# Patient Record
Sex: MALE | URBAN - METROPOLITAN AREA
[De-identification: names, ages, dates, MRNs, and addresses within clinical notes are randomized per-mention and may not be internally consistent; named-entity substitution may affect disease eponyms.]

---

## 2018-02-12 ENCOUNTER — OFFICE VISIT (OUTPATIENT)
Dept: OPHTHALMOLOGY | Facility: CLINIC | Age: 63
End: 2018-02-12
Payer: COMMERCIAL

## 2018-02-12 DIAGNOSIS — H52.4 PRESBYOPIA: ICD-10-CM

## 2018-02-12 DIAGNOSIS — H52.13 MYOPIA OF BOTH EYES: ICD-10-CM

## 2018-02-12 DIAGNOSIS — H43.813 POSTERIOR VITREOUS DETACHMENT, BILATERAL: ICD-10-CM

## 2018-02-12 DIAGNOSIS — H25.13 NUCLEAR SCLEROSIS OF BOTH EYES: Primary | ICD-10-CM

## 2018-02-12 PROCEDURE — 92004 COMPRE OPH EXAM NEW PT 1/>: CPT | Performed by: STUDENT IN AN ORGANIZED HEALTH CARE EDUCATION/TRAINING PROGRAM

## 2018-02-12 PROCEDURE — 92015 DETERMINE REFRACTIVE STATE: CPT | Performed by: STUDENT IN AN ORGANIZED HEALTH CARE EDUCATION/TRAINING PROGRAM

## 2018-02-12 ASSESSMENT — REFRACTION_WEARINGRX
OS_AXIS: 016
OS_ADD: +2.50
OD_ADD: +2.50
OD_CYLINDER: SPHERE
OS_SPHERE: -10.50
OS_CYLINDER: +0.75
SPECS_TYPE: PAL
OD_SPHERE: -9.00

## 2018-02-12 ASSESSMENT — VISUAL ACUITY
CORRECTION_TYPE: GLASSES
METHOD: SNELLEN - LINEAR
OD_BAT_HIGH: 20/50-1
OD_CC: 20/40
OS_BAT_LOW: 20/200
OS_CC+: -1
OD_BAT_MED: 20/30
OS_PH_CC: 20/40-2
OS_CC: 20/80

## 2018-02-12 ASSESSMENT — TONOMETRY
OD_IOP_MMHG: 14
IOP_METHOD: APPLANATION
OS_IOP_MMHG: 17

## 2018-02-12 ASSESSMENT — REFRACTION_MANIFEST
OD_ADD: +2.50
OS_AXIS: 135
OS_SPHERE: -12.00
OD_CYLINDER: +0.25
OS_ADD: +2.50
OD_AXIS: 180
OS_CYLINDER: +0.25
OD_SPHERE: -9.00

## 2018-02-12 ASSESSMENT — EXTERNAL EXAM - RIGHT EYE: OD_EXAM: NORMAL

## 2018-02-12 ASSESSMENT — CONF VISUAL FIELD
OD_NORMAL: 1
METHOD: COUNTING FINGERS
OS_NORMAL: 1

## 2018-02-12 ASSESSMENT — SLIT LAMP EXAM - LIDS
COMMENTS: NORMAL
COMMENTS: NORMAL

## 2018-02-12 ASSESSMENT — EXTERNAL EXAM - LEFT EYE: OS_EXAM: NORMAL

## 2018-02-12 ASSESSMENT — CUP TO DISC RATIO
OS_RATIO: 0.15
OD_RATIO: 0.15

## 2018-02-12 NOTE — PROGRESS NOTES
Current Eye Medications:  no     Subjective:  Cataract eval, also was told retina was stretched. Vision is blurred distance with glasses for last year. Glasses are 6 years old. Having some watering right eye for last year. Zero pain both eyes.. Floaters forever both eyes, have not increased in size or numbers. Zero flashes, or curtain effect.   Last eye exam was 1 year ago. No previous eye injuries or surgeries.      Objective:  See Ophthalmology Exam.    Assessment:  Juni Ballesteros is a 62 year old male who presents with:     Nuclear sclerosis of both eyes Approaching visually significant.   He would like to try glasses first for the left eye and defer surgery for now. Discussed probably retinal evaluation prior to cataract surgery.      Posterior vitreous detachment, bilateral        Plan:  Glasses prescription given - optional     Ravinder Myers MD  [phone]

## 2018-02-12 NOTE — MR AVS SNAPSHOT
"              After Visit Summary   2/12/2018    Juni Ballesteros    MRN: [id number]           Patient Information     Date Of Birth          1955        Visit Information        Provider Department      2/12/2018 2:15 PM Ravinder Myers MD [facility]        Today's Diagnoses     Nuclear sclerosis of both eyes    -  1    Posterior vitreous detachment, bilateral        Presbyopia        Myopia of both eyes          Care Instructions    Glasses prescription given - optional     Ravinder Myers MD  [phone]                Follow-ups after your visit        Follow-up notes from your care team     Return in about 1 year (around 2/12/2019) for Complete Exam.      Who to contact     If you have questions or need follow up information about today's clinic visit or your schedule please contact [facility] directly at [phone].  Normal or non-critical lab and imaging results will be communicated to you by MyChart, letter or phone within 4 business days after the clinic has received the results. If you do not hear from us within 7 days, please contact the clinic through MyChart or phone. If you have a critical or abnormal lab result, we will notify you by phone as soon as possible.  Submit refill requests through [employer] or call your pharmacy and they will forward the refill request to us. Please allow 3 business days for your refill to be completed.          Additional Information About Your Visit        MyChart Information     [employer] lets you send messages to your doctor, view your test results, renew your prescriptions, schedule appointments and more. To sign up, go to [email or web address] . Click on \"Log in\" on the left side of the screen, which will take you to the Welcome page. Then click on \"Sign up Now\" on the right side of the page.     You will be asked to enter the access code listed below, as well as some personal information. Please follow the directions to " create your username and password.     Your access code is: [email or web address]  Expires: 2018  3:59 PM     Your access code will  in 90 days. If you need help or a new code, please call your [place] clinic or [phone].        Care EveryWhere ID     This is your Care EveryWhere ID. This could be used by other organizations to access your [place] medical records  [id number]         Blood Pressure from Last 3 Encounters:   12 130/80   12 122/82   08 118/78    Weight from Last 3 Encounters:   12 79.4 kg (175 lb)   12 80.7 kg (178 lb)   08 81.6 kg (180 lb)              We Performed the Following     EYE EXAM (SIMPLE-NONBILLABLE)     REFRACTIVE STATUS        Primary Care Provider Fax #    Physician No Ref-Primary [phone]       No address on file        Equal Access to Services     SHANE ALBERTO AH: Hadii tyler packo Sodamarisali, waaxda luqadaha, qaybta kaalmada adeegyada, lester fajardo . [facility] [phone].    ATENCIÓN: Si morenola espalba, tiene a guido disposición servicios gratuitos de asistencia lingüística. Llame al [phone].    We comply with applicable federal civil rights laws and [place] laws. We do not discriminate on the basis of race, color, national origin, age, disability, sex, sexual orientation, or gender identity.            Thank you!     Thank you for choosing [facility]  for your care. Our goal is always to provide you with excellent care. Hearing back from our patients is one way we can continue to improve our services. Please take a few minutes to complete the written survey that you may receive in the mail after your visit with us. Thank you!             Your Updated Medication List - Protect others around you: Learn how to safely use, store and throw away your medicines at www.disposemymeds.org.          This list is accurate as of 18  3:59 PM.  Always use your most recent med list.                    Brand Name Dispense Instructions for use Diagnosis    NO ACTIVE MEDICATIONS     0    .

## 2018-02-12 NOTE — LETTER
2/12/2018         RE: Juni Ballesteros  [address] [address] [address]        Dear Colleague,    Thank you for referring your patient, Juni Ballesteros, to the [facility]. Please see a copy of my visit note below.     Current Eye Medications:  no     Subjective:  Cataract eval, also was told retina was stretched. Vision is blurred distance with glasses for last year. Glasses are 6 years old. Having some watering right eye for last year. Zero pain both eyes.. Floaters forever both eyes, have not increased in size or numbers. Zero flashes, or curtain effect. Last eye exam was 1 year ago.     Objective:  See Ophthalmology Exam.    Assessment:  Juni Ballesteros is a 62 year old male who presents with:     Nuclear sclerosis of both eyes Approaching visually significant      Posterior vitreous detachment, bilateral        Plan:  Glasses prescription given - optional     Ravinder Myers MD  [phone]                   Again, thank you for allowing me to participate in the care of your patient.        Sincerely,        Ravinder Myers MD

## 2019-02-14 ENCOUNTER — OFFICE VISIT (OUTPATIENT)
Dept: FAMILY MEDICINE | Facility: CLINIC | Age: 64
End: 2019-02-14
Payer: COMMERCIAL

## 2019-02-14 VITALS
HEART RATE: 106 BPM | SYSTOLIC BLOOD PRESSURE: 120 MMHG | OXYGEN SATURATION: 99 % | BODY MASS INDEX: 25.18 KG/M2 | WEIGHT: 170 LBS | DIASTOLIC BLOOD PRESSURE: 82 MMHG | HEIGHT: 69 IN | TEMPERATURE: 98.4 F

## 2019-02-14 DIAGNOSIS — H25.9 AGE-RELATED CATARACT OF BOTH EYES, UNSPECIFIED AGE-RELATED CATARACT TYPE: ICD-10-CM

## 2019-02-14 DIAGNOSIS — Z01.818 PREOP GENERAL PHYSICAL EXAM: Primary | ICD-10-CM

## 2019-02-14 DIAGNOSIS — Z12.11 SCREENING FOR COLON CANCER: ICD-10-CM

## 2019-02-14 PROCEDURE — 99214 OFFICE O/P EST MOD 30 MIN: CPT | Performed by: FAMILY MEDICINE

## 2019-02-14 ASSESSMENT — MIFFLIN-ST. JEOR: SCORE: 1556.49

## 2019-02-14 NOTE — PROGRESS NOTES
[address] [address]  [phone]  Dept: [phone]    PRE-OP EVALUATION:  Today's date: 2019    Juni Ballesteros (: 1955) presents for pre-operative evaluation assessment as requested by Dr. Sharp.  He requires evaluation and anesthesia risk assessment prior to undergoing surgery/procedure for treatment of Cataract .    Proposed Surgery/ Procedure: Cataract  Date of Surgery/ Procedure: 2019 Left  - 2019 Right  Time of Surgery/ Procedure: [facility]  Hospital/Surgical Facility: [facility]  Fax number for surgical facility: [phone]  Primary Physician: No Ref-Primary, Physician  Type of Anesthesia Anticipated: to be determined    Patient has a Health Care Directive or Living Will:  NO    1. NO - Do you have a history of heart attack, stroke, stent, bypass or surgery on an artery in the head, neck, heart or legs?  2. NO - Do you ever have any pain or discomfort in your chest?  3. NO - Do you have a history of  Heart Failure?  4. NO - Are you troubled by shortness of breath when: walking on the level, up a slight hill or at night?  5. NO - Do you currently have a cold, bronchitis or other respiratory infection?  6. NO - Do you have a cough, shortness of breath or wheezing?  7. NO - Do you sometimes get pains in the calves of your legs when you walk?  8. NO - Do you or anyone in your family have previous history of blood clots?  9. NO - Do you or does anyone in your family have a serious bleeding problem such as prolonged bleeding following surgeries or cuts?  10. NO - Have you ever had problems with anemia or been told to take iron pills?  11. NO - Have you had any abnormal blood loss such as black, tarry or bloody stools, or abnormal vaginal bleeding?  12. NO - Have you ever had a blood transfusion?  13. NO - Have you or any of your relatives ever had problems with anesthesia?  14. NO - Do you have sleep  apnea, excessive snoring or daytime drowsiness?  15. NO - Do you have any prosthetic heart valves?  16. NO - Do you have prosthetic joints?    HPI:     HPI related to upcoming procedure: Juni will be undergoing cataract surgery in bilateral eyes.     HYPERLIPIDEMIA - Patient has a long history of significant Hyperlipidemia requiring medication for treatment with recent good control.                                                                                           .    MEDICAL HISTORY:     Patient Active Problem List    Diagnosis Date Noted     Posterior vitreous detachment, bilateral 2018     Priority: Medium     Nuclear sclerosis of both eyes 2018     Priority: Medium     Advanced directives, counseling/discussion 2012     Priority: Medium     Advance Directive Problem List Overview:   Name Relationship Phone    Primary Health Care Agent            Alternative Health Care Agent          Discussed advance care planning with patient; information given to patient to review. 2012     Left message for patient to call back to arrange facilitation if desired for completion of Advanced Care Directive. Gayatri Schwab LPN/Advanced Healthcare Planning Facilitator  3/13/12             CARDIOVASCULAR SCREENING; LDL GOAL LESS THAN 160 2012     Priority: Medium      Past Medical History:   Diagnosis Date     None      Past Surgical History:   Procedure Laterality Date     MOUTH SURGERY      [place] [facility]     TONSILLECTOMY  1965     No current outpatient medications on file.     OTC products: None, except as noted above    Allergies   Allergen Reactions     No Known Drug Allergies       Latex Allergy: NO    Social History     Tobacco Use     Smoking status: Former Smoker     Packs/day: 0.01     Years: 2.00     Pack years: 0.02     Start date: 1983     Last attempt to quit: 1985     Years since quittin.0     Smokeless tobacco: Never Used     Tobacco comment: 3 cig. a day.  "  Substance Use Topics     Alcohol use: Yes     Alcohol/week: 1.0 oz     Types: 2 Standard drinks or equivalent per week     Comment: occ     History   Drug Use No       REVIEW OF SYSTEMS:   Constitutional, HEENT, cardiovascular, pulmonary, GI, , musculoskeletal, neuro, skin, endocrine and psych systems are negative, except as otherwise noted.    EXAM:   /82   Pulse 106   Temp 98.4  F (36.9  C) (Oral)   Ht 1.753 m (5' 9\")   Wt 77.1 kg (170 lb)   SpO2 99%   BMI 25.10 kg/m      GENERAL APPEARANCE: healthy, alert and no distress     EYES: EOMI,  PERRL     HENT: ear canals and TM's normal and nose and mouth without ulcers or lesions     NECK: no adenopathy, no asymmetry, masses, or scars and thyroid normal to palpation     RESP: lungs clear to auscultation - no rales, rhonchi or wheezes     CV: regular rates and rhythm, normal S1 S2, no S3 or S4 and no murmur, click or rub     ABDOMEN:  soft, nontender, no HSM or masses and bowel sounds normal     MS: extremities normal- no gross deformities noted, no evidence of inflammation in joints, FROM in all extremities.     SKIN: no suspicious lesions or rashes     NEURO: Normal strength and tone, sensory exam grossly normal, mentation intact and speech normal     PSYCH: mentation appears normal. and affect normal/bright     LYMPHATICS: No cervical adenopathy    DIAGNOSTICS:   No labs or EKG required for low risk surgery (cataract, skin procedure, breast biopsy, etc)    IMPRESSION:   Reason for surgery/procedure: Juni will be undergoing a cataract surgery for bilateral eyes.     The proposed surgical procedure is considered LOW risk.    REVISED CARDIAC RISK INDEX  The patient has the following serious cardiovascular risks for perioperative complications such as (MI, PE, VFib and 3  AV Block):  No serious cardiac risks  INTERPRETATION: 0 risks: Class I (very low risk - 0.4% complication rate)    The patient has the following additional risks for perioperative " complications:  No identified additional risks      ICD-10-CM    1. Preop general physical exam Z01.818    2. Age-related cataract of both eyes, unspecified age-related cataract type H25.9        RECOMMENDATIONS:         --Patient is on no chronic medications    APPROVAL GIVEN to proceed with proposed procedure, without further diagnostic evaluation       Signed Electronically by: Siddharth Stock MD    Copy of this evaluation report is provided to requesting physician.    [facility] Preop Guidelines    Revised Cardiac Risk Index

## 2019-02-19 PROCEDURE — 82274 ASSAY TEST FOR BLOOD FECAL: CPT | Performed by: FAMILY MEDICINE

## 2019-02-24 LAB — HEMOCCULT STL QL IA: NEGATIVE

## 2019-02-25 DIAGNOSIS — Z12.11 SCREENING FOR COLON CANCER: ICD-10-CM

## 2019-02-25 NOTE — LETTER
[address], [place] [address] [phone]   February 25, 2019    Juni Ballesteros  [address] [address] [address]      Dear Juni,    Here is a summary of your recent test results:    FIT test (screening test for colon cancer) was normal. ADVISE: rechecking this test in 1 year.     Your test results are enclosed.      Please contact me if you have any questions.             Thank you very much for trusting [facility]..     Healthy regards,          Isreal Stock M.D.        Results for orders placed or performed in visit on 02/25/19   Fecal colorectal cancer screen (FIT)   Result Value Ref Range    Occult Blood Scn FIT Negative NEG^Negative

## 2019-03-26 ENCOUNTER — TELEPHONE (OUTPATIENT)
Dept: FAMILY MEDICINE | Facility: CLINIC | Age: 64
End: 2019-03-26

## 2019-03-26 NOTE — TELEPHONE ENCOUNTER
Routing to RL to review and advise.  Right eye cataract surgery is 3/28/2019.  Pre-op was 2/14/2019.      Rose Mary Burdick, BS, RN, PHN  [facility]) [phone]

## 2019-03-26 NOTE — TELEPHONE ENCOUNTER
Reason for Call:  Other call back    Detailed comments: PT was seen by  On 2- for a pre-op. It is out of the 30 day window. Will the  Give the Ok or will the Pt need to come back in for a new pre- op appt.   DOS is 3-    Phone Number Patient can be reached at: Other phone number:  [phone]    Best Time: any      Can we leave a detailed message on this number? Yes     Call taken on 3/26/2019 at 1:07 PM by Aletha Sandhu